# Patient Record
Sex: FEMALE | Race: WHITE
[De-identification: names, ages, dates, MRNs, and addresses within clinical notes are randomized per-mention and may not be internally consistent; named-entity substitution may affect disease eponyms.]

---

## 2018-04-12 ENCOUNTER — HOSPITAL ENCOUNTER (EMERGENCY)
Dept: HOSPITAL 31 - C.ER | Age: 58
Discharge: HOME | End: 2018-04-12
Payer: COMMERCIAL

## 2018-04-12 VITALS — HEART RATE: 67 BPM | TEMPERATURE: 97.6 F | SYSTOLIC BLOOD PRESSURE: 132 MMHG | DIASTOLIC BLOOD PRESSURE: 85 MMHG

## 2018-04-12 VITALS — RESPIRATION RATE: 20 BRPM

## 2018-04-12 DIAGNOSIS — M25.562: ICD-10-CM

## 2018-04-12 DIAGNOSIS — M25.522: Primary | ICD-10-CM

## 2018-04-12 NOTE — C.PDOC
History Of Present Illness


59 y/o female presents to ED with complaints of left elbow pain and left knee 

pain since December worsening for "several days" which prompted visit to ED 

today. Patient states he took Advil yesterday for pain with mild relief. Pain 

is worsened with movement. Patient denies swelling to hand, trauma, rash, 

weakness, numbness or any other complaints at this time. 


Time Seen by Provider: 04/12/18 08:47


Chief Complaint (Nursing): Upper Extremity Problem/Injury


History Per: Patient


History/Exam Limitations: no limitations


Onset/Duration Of Symptoms: Days


Current Symptoms Are (Timing): Still Present


Quality: "Pain"


Recent travel outside of the [place]: No





Past Medical History


Reviewed: Historical Data, Nursing Documentation, Vital Signs


Vital Signs: 


 Last Vital Signs











Temp  97.6 F   04/12/18 10:46


 


Pulse  67   04/12/18 10:46


 


Resp  20   04/12/18 10:46


 


BP  132/85   04/12/18 10:46


 


Pulse Ox  99   04/12/18 10:46














- Medical History


PMH: Fractures (Rt arm 7 years ago), HTN, Hypercholesterolemia


Surgical History: No Surg Hx


Family History: States: No Known Family Hx





- Social History


Hx Alcohol Use: Yes


Hx Substance Use: No





- Immunization History


Hx Tetanus Toxoid Vaccination: No


Hx Influenza Vaccination: No


Hx Pneumococcal Vaccination: No





Review Of Systems


Constitutional: Negative for: Fever, Weakness


Gastrointestinal: Negative for: Nausea, Vomiting


Musculoskeletal: Positive for: Arm Pain, Leg Pain


Skin: Negative for: Rash


Neurological: Negative for: Weakness, Numbness





Physical Exam





- Physical Exam


Appears: Non-toxic, No Acute Distress


Skin: Warm, Dry, No Rash


Head: Atraumatic, Normacephalic


Eye(s): bilateral: Normal Inspection


Oral Mucosa: Moist


Neck: Normal ROM, Supple


Extremity: Normal ROM, No Tenderness, Capillary Refill (<2 seconds), No Swelling

, Other (Bone spurs to left elbow and knee)


Neurological/Psych: Oriented x3, Normal Speech, Normal Motor, Normal Sensation


Gait: Steady





ED Course And Treatment


O2 Sat by Pulse Oximetry: 100 (RA)


Pulse Ox Interpretation: Normal





Medical Decision Making


Medical Decision Making: 


Plan: Tylenol and Naprosen administered





Disposition





- Disposition


Referrals: 


[facility] [Outside]


Disposition: HOME/ ROUTINE


Disposition Time: 10:29


Condition: GOOD


Additional Instructions: 


Follow up with medical doctor within 1-2 days. Return if worsened. 


Prescriptions: 


Acetaminophen [Tylenol] 325 mg PO Q6 PRN #30 tab


 PRN Reason: Pain, Mild (1-3)


Naproxen [Naprosyn] 500 mg PO BID #20 tab


Instructions:  Osteoarthritis


Forms:  [facility] (English)


Print Language: Slovak





- POA


Present On Arrival: None





- Clinical Impression


Clinical Impression: 


 Joint pain, Elbow pain, Knee pain








- PA / NP / Resident Statement


MD/DO has reviewed & agrees with the documentation as recorded.





- Scribe Statement


The provider has reviewed the documentation as recorded by the Pavanibphoebe Elmore





All medical record entries made by the Matt were at my direction and 

personally dictated by me. I have reviewed the chart and agree that the record 

accurately reflects my personal performance of the history, physical exam, 

medical decision making, and the department course for this patient. I have 

also personally directed, reviewed, and agree with the discharge instructions 

and disposition.

## 2018-04-16 VITALS — OXYGEN SATURATION: 100 %

## 2018-06-28 ENCOUNTER — HOSPITAL ENCOUNTER (EMERGENCY)
Dept: HOSPITAL 31 - C.ER | Age: 58
Discharge: HOME | End: 2018-06-28
Payer: COMMERCIAL

## 2018-06-28 VITALS
TEMPERATURE: 98.8 F | OXYGEN SATURATION: 96 % | DIASTOLIC BLOOD PRESSURE: 79 MMHG | RESPIRATION RATE: 18 BRPM | HEART RATE: 82 BPM | SYSTOLIC BLOOD PRESSURE: 115 MMHG

## 2018-06-28 DIAGNOSIS — S93.402A: Primary | ICD-10-CM
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
## 2018-06-28 DIAGNOSIS — E78.00: ICD-10-CM

## 2018-06-28 NOTE — RAD
PROCEDURE:  Left Foot Radiographs.



HISTORY:

Pain



COMPARISON:

None.



FINDINGS:



BONES:

Bone alignment is normal.  There is mild periarticular bone 

demineralization. No acute displaced fracture or bone destruction. 

There is a small plantar calcaneal spur.



JOINTS:

Normal. 



SOFT TISSUES:

There is mild dorsal soft tissue swelling. 



OTHER FINDINGS:

None.



IMPRESSION:

No acute fracture or dislocation.

## 2018-06-28 NOTE — RAD
PROCEDURE:  Left Ankle Radiographs.



HISTORY:

pain s.p fall 3 weeks ago  



COMPARISON:

None



FINDINGS:



BONES:

Normal. No fracture. 



JOINTS:

Normal. No osteoarthritis. Ankle mortise maintained. Talar dome intact



SOFT TISSUES:

Normal. 



OTHER FINDINGS:

None.



IMPRESSION:

Normal left ankle radiographs.

## 2018-06-28 NOTE — C.PDOC
History Of Present Illness


57 y/o female presents to the ER complaining of left knee and ankle pain which 

began after she twisted her left ankle and fell 3 weeks ago. Patient states 

that she applied ice and takes Tylenol or Advil. Patient wears heels daily and 

area is still swollen. Denies having weakness and numbness.


Time Seen by Provider: 06/28/18 12:15


Chief Complaint (Nursing): Lower Extremity Problem/Injury


History Per: Patient


History/Exam Limitations: no limitations


Onset/Duration Of Symptoms: Days


Current Symptoms Are (Timing): Still Present


Severity: Moderate





- Ankle/Foot


Description Of Injury: Twisted (left ankle)





Past Medical History


Reviewed: Historical Data, Nursing Documentation, Vital Signs


Vital Signs: 


 Last Vital Signs











Temp  98.8 F   06/28/18 12:03


 


Pulse  82   06/28/18 12:03


 


Resp  18   06/28/18 12:03


 


BP  115/79   06/28/18 12:03


 


Pulse Ox  96   06/28/18 13:59














- Medical History


PMH: Fractures (Rt arm 7 years ago), HTN (denies), Hypercholesterolemia


Family History: States: Unknown Family Hx





- Social History


Hx Alcohol Use: Yes


Hx Substance Use: No





- Immunization History


Hx Tetanus Toxoid Vaccination: No


Hx Influenza Vaccination: No


Hx Pneumococcal Vaccination: No





Review Of Systems


Except As Marked, All Systems Reviewed And Found Negative.


Musculoskeletal: Positive for: Foot Pain, Other (left ankle pain )





Physical Exam





- Physical Exam


Appears: Non-toxic, No Acute Distress, Other (Patient wearing 3 inch high heels)


Skin: Warm, Dry, No Rash, No Ecchymosis


Head: Atraumatic, Normacephalic


Eye(s): bilateral: Normal Inspection


Neck: Supple


Chest: Symmetrical


Respiratory: No Rales


Extremity: Normal ROM, Tenderness (tenderness to lateral aspect of left ankle, 

mild tenderness to anterior aspect of left knee), No Calf Tenderness, No 

Deformity, Swelling (moderate swelling to left ankle and foot)


Pulses: Left Dorsalis Pedis: Normal


Neurological/Psych: Oriented x3, Normal Speech


Gait: Steady





ED Course And Treatment


O2 Sat by Pulse Oximetry: 96 (RA)


Pulse Ox Interpretation: Normal





- Other Rad


  ** X-Ray- Left Knee


X-Ray: Viewed By Me, Read By Radiologist


Interpretation: PROCEDURE:  Left Knee Radiographs.  HISTORY:  Pain.  COMPARISON

:  None.  FINDINGS:  BONES:  Normal. No fracture.  JOINTS:  Normal. No 

osteoarthritis.  JOINT EFFUSION:  None.  OTHER FINDINGS:  None.  IMPRESSION:  

Normal radiographs of the left knee.





  ** X-Ray- Left Ankle


X-Ray: Viewed By Me, Read By Radiologist


Interpretation: PROCEDURE:  Left Ankle Radiographs.  HISTORY:  pain s.p fall 3 

weeks ago.  COMPARISON:  None.  FINDINGS:  BONES:  Normal. No fracture.  JOINTS

:  Normal. No osteoarthritis. Ankle mortise maintained. Talar dome intact.  

SOFT TISSUES:  Normal.  OTHER FINDINGS:  None.  IMPRESSION:  Normal left ankle 

radiographs.





  ** X-Ray- Left Foot


X-Ray: Viewed By Me, Read By Radiologist


Interpretation: PROCEDURE:  Left Foot Radiographs.  HISTORY:  Pain.  COMPARISON

:  None.  FINDINGS:  BONES:  Bone alignment is normal.  There is mild 

periarticular bone demineralization. No acute displaced fracture or bone 

destruction. There is a small plantar calcaneal spur.  JOINTS:  Normal.  SOFT 

TISSUES:  There is mild dorsal soft tissue swelling.  OTHER FINDINGS:  None.  

IMPRESSION:  No acute fracture or dislocation.





Medical Decision Making


Medical Decision Making: 


Impression: ankle and knee injury


Plan:


* Motrin PO


*  X- Ray- Left Knee


* X- Ray - Left Foot and ankle 


Progress:


xrays viewed by me showing no acute fractures or dislocations. 


Peralta dressing applied to left foot by CP and checked by me. Ortho shoe 

applied. 


Patient instructed to not wear heels. Advise rest, ice, elevation and to take 

NSAID. Instruct to follow up with Podiatry





Disposition


Counseled Patient/Family Regarding: Diagnosis, Need For Followup, Rx Given





- Disposition


Referrals: 


[facility] [Outside]


[facility] [Other]


Disposition: HOME/ ROUTINE


Disposition Time: 13:21


Condition: GOOD


Additional Instructions: 


Your xray was normal, no fracture. Please apply ice to area 15 minutes three 

times a day. Take Motrin as needed for pain every 6 hours, with food to not 

upset stomach. Follow up with podiatry clinic


Prescriptions: 


Ibuprofen [Motrin] 600 mg PO Q8 #30 tab


Instructions:  Ankle Sprain (DC)


Forms:  [facility] (English)





- POA


Present On Arrival: None





- Clinical Impression


Clinical Impression: 


 Ankle sprain








- PA / NP / Resident Statement


MD/DO has reviewed & agrees with the documentation as recorded.





- Scribe Statement


The provider has reviewed the documentation as recorded by the aMtt De Jesus





Provider Attestation





All medical record entries made by the Pavanibphoebe were at my direction and 

personally dictated by me. I have reviewed the chart and agree that the record 

accurately reflects my personal performance of the history, physical exam, 

medical decision making, and the department course for this patient. I have 

also personally directed, reviewed, and agree with the discharge instructions 

and disposition.

## 2019-02-27 ENCOUNTER — HOSPITAL ENCOUNTER (EMERGENCY)
Dept: HOSPITAL 31 - C.ER | Age: 59
Discharge: HOME | End: 2019-02-27
Payer: COMMERCIAL

## 2019-02-27 VITALS
SYSTOLIC BLOOD PRESSURE: 127 MMHG | HEART RATE: 80 BPM | RESPIRATION RATE: 20 BRPM | DIASTOLIC BLOOD PRESSURE: 80 MMHG | TEMPERATURE: 98.1 F

## 2019-02-27 VITALS — OXYGEN SATURATION: 96 %
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
## 2019-02-27 DIAGNOSIS — E78.00: ICD-10-CM

## 2019-02-27 DIAGNOSIS — S09.90XA: Primary | ICD-10-CM

## 2019-02-27 DIAGNOSIS — I10: ICD-10-CM
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
## 2019-02-27 DIAGNOSIS — T14.8XXA: ICD-10-CM

## 2019-02-27 NOTE — RAD
Date of service: 



02/27/2019



PROCEDURE:  Left Knee Radiographs.



HISTORY:

Pain.



COMPARISON:

Comparison made with prior radiographs left knee 06/28/2018.



FINDINGS:



BONES:

No evidence of acute displaced fracture nor dislocation.  Osseous 

structures intact. 



JOINTS:

Joint spaces preserved.  No significant osteoarthritis. 



JOINT EFFUSION:

Suspect trace joint effusion. 



OTHER FINDINGS:

None.



IMPRESSION:

No acute fractures..

## 2019-02-27 NOTE — CT
Date of service: 



02/27/2019



PROCEDURE:  CT Cervical Spine without contrast



HISTORY:

Neck pain. 



COMPARISON:

None available.



TECHNIQUE:

Axial computed tomography images were obtained of the cervical spine 

without the use of intravenous contrast. Coronal and sagittal 

reformatted images were created and reviewed.



Radiation dose:



Total exam DLP = 479.63 mGy-cm.



This CT exam was performed using one or more of the following dose 

reduction techniques: Automated exposure control, adjustment of the 

mA and/or kV according to patient size, and/or use of iterative 

reconstruction technique.



FINDINGS:



VERTEBRAE:

.  The the no acute compression fractures nor retropulsed fragments.  

There is very slight anterior stature loss of the C5 segment felt to 

be degenerative in origin.  Vertebral bodies otherwise exhibit normal 

stature..  There is mild straightening of the normal cervical 

lordosis which may in part be due to patient positioning gantry 

however underlying element of muscle spasm may contribute 



DISCS/SPINAL CANAL/NEURAL FORAMINA:

Multilevel degenerative spondylosis. 



At the C2-C3 level, there is mild disc space narrowing.  There is 

what appears represent a small central and bilateral (left slightly 

larger than right) disc protrusion which compresses the ventral 

surface of the thecal sac though does not cause significant cord 

compression or canal stenosis.  The exit foramina adequate. 



At the C3-C4 level, there is disc space narrowing.  Small central and 

bilateral focal disc bulge indents the ventral surface of the thecal 

sac and appears to reach the ventral surface of the cord.  Central 

canal appears adequate.  Exit foramina are also adequate despite 

minimally overgrown  facet joints. 



At the C 4 C5 level, there is disc space narrowing with a small focal 

central disc herniation that compresses the ventral surface of the 

thecal sac and spinal cord.  Central canal does appears marginal to 

minimally narrowed.  Minimal degenerative squaring of the 

uncovertebral joints.  The left facet joints hypertrophic.  Exit 

foramina are marginal to adequate on the right and marginal to 

slightly narrowed on the left. 



At the C5-C6 level, there is marked disc space narrowing with 

endplate eburnation and subchondral cystic changes..  Small of 

broad-based osteophytic ridge disc complex asymmetrically larger on 

the left than right and contiguous with hypertrophic  uncovertebral  

joints noted..  The changes result in minor flattening of the ventral 

surface of the thecal sac and minor flattening of the ventral surface 

of the cord more so on the left side.  Central canal slightly 

narrowed.  The facets are also hypertrophic.  Exit foramina are 

stenotic bilaterally.



Less severe but similar changes seen at the C6-C7 level with 

asymmetric disc ridge complex slightly larger on the left than right 

and also contiguous with hypertrophic uncovertebral joints.  The 

central canal appears adequate.  Left exit foramen is stenotic.  

Right exit foramen is marginal to adequate.  



PARASPINAL SOFT TISSUES:

Unremarkable. 



OTHER FINDINGS:

Lung apices are clear. 



IMPRESSION:

No acute fractures.  Multilevel degenerative spondylosis as described.

## 2019-02-27 NOTE — RAD
Date of service: 



02/27/2019



PROCEDURE:  Radiographs of the Lumbar Spine.



HISTORY:

Trauma 



COMPARISON:

No prior.



FINDINGS:



BONES:

There are no acute compression fractures no retropulsed fragments.  

Vertebral bodies exhibit relatively normal stature.  There is a mild 

dextroscoliosis centered at approximately the L2-L3 level.  Vertebral 

bodies otherwise exhibit normal alignment.  Facets normally aligned.  

. 



DISC SPACES:

Multilevel degenerative spondylosis most notably affecting the L4-L5 

and L5-S1 levels..  Changes include varying degrees of disc space 

narrowing more so along the posterior disc margins with endplate 

eburnation and small anterolateral as well as posterior osteophyte 

formation.  Facets also mildly hypertrophic the L5-S1 through the L 2 

L3 levels in decreasing order of severity



OTHER FINDINGS:

None.



IMPRESSION:

No acute fractures.  Mild multilevel degenerative spondylosis.  Mild 

dextroscoliosis.

## 2019-02-27 NOTE — C.PDOC
History Of Present Illness


58 year old female presents to ED s/p fall that occurred outside. Patient fell 

outside injuring her left knee. Patient also has a small abrasion to her nose 

and laceration to her lower lip. She has a history of dizziness. Patient claims 

to have lost consciousness when she fell. She denies any numbness or weakness. 








- HPI


Time Seen by Provider: 19 09:54


Chief Complaint (Nursing): Trauma


History Per: Patient


History/Exam Limitations: no limitations


Onset/Duration Of Symptoms: Hrs


Location Of Injury: Left: Knee (abrasion), Anterior: Knee


Associated Symptoms: LOC





- Fall


Fall:Prior To Injury: Tripped





Past Medical History


Reviewed: Historical Data, Nursing Documentation, Vital Signs


Vital Signs: 





                                Last Vital Signs











Temp  98.3 F   19 09:52


 


Pulse  96 H  19 09:52


 


Resp  18   19 09:52


 


BP  143/92 H  19 09:52


 


Pulse Ox  96   19 09:52














- Medical History


PMH: Fractures (Rt arm 7 years ago), HTN, Hypercholesterolemia


   Denies: Chronic Kidney Disease


Surgical History: No Surg Hx


Family History: States: Unknown Family Hx





- Social History


Hx Alcohol Use: Yes


Hx Substance Use: No





- Immunization History


Hx Tetanus Toxoid Vaccination: No


Hx Influenza Vaccination: No


Hx Pneumococcal Vaccination: No





Review Of Systems


Constitutional: Negative for: Fever, Chills, Weakness


Musculoskeletal: Positive for: Leg Pain (abrasion to the left knee)


Neurological: Negative for: Weakness, Numbness, Headache, Dizziness





Physical Exam





- Physical Exam


Appears: Well, Non-toxic, No Acute Distress


Skin: Normal Color, Warm, Dry


Head: Atraumatic, Normacephalic


Nose: Other (Superficial abrasion )


Lips: Laceration (inner aspect of the lower lip)


Teeth: Normal Dentition (intact), No Loose


Neck: Normal ROM, Supple


Chest: Symmetrical, No Deformity


Respiratory: No Accessory Muscle Use


Gastrointestinal/Abdominal: Soft, No Tenderness


Extremity: Capillary Refill (<2 seconds), Other (Left knee abrasion)


Extremity: Right: Atraumatic, Normal Color And Temperature, Normal ROM


Neurological/Psych: Oriented x3, Normal Speech, Normal Cognition


Gait: Steady





ED Course And Treatment


ECG: Interpreted By Me, Viewed By Me


ECG Rhythm: Sinus Rhythm


ECG Interpretation: Normal


Rate From EC


O2 Sat by Pulse Oximetry: 96 (RA)


Pulse Ox Interpretation: Normal





- Other Rad


  ** Left Knee X-ray


X-Ray: Interpreted by Me, Viewed By Me


Interpretation: Accession No. : [id number].  Patient Name / ID : ANTONY SANDS  / [id number].  Exam Date : 2019 10:17:07 ( Approved ).  Study 

Comment :  Sex / Age : F  / 58Y.  Creator : femi martin.  Dictator : Dominick Massey MD.   :  Approver : Dominick Massey MD.  Approver2 

:  Report Date : 2019 10:32:43.  My Comment :  

********************************************************************************


***.  This report is currently processing and HAS NOT BEEN OFFICIALLY SIGNED BY 

THE PHYSICIAN - ESTIMATED TIME OF APPROVAL IS 2019 11:07.  Date of 

service:  2019.  PROCEDURE:  Left Knee Radiographs.  HISTORY:  Pain.  

COMPARISON:  Comparison made with prior radiographs left knee 2018.  

FINDINGS:  BONES:  No evidence of acute displaced fracture nor dislocation.  

Osseous structures intact.  JOINTS:  Joint spaces preserved.  No significant os

teoarthritis.  JOINT EFFUSION:  Suspect trace joint effusion.  OTHER FINDINGS:  

None.  IMPRESSION:  No acute fractures..





  ** L-spine X-ray


X-Ray: Interpreted by Me, Viewed By Me


Interpretation: Accession No. : [id number].  Patient Name / ID : ANTONY SANDS  / [id number].  Exam Date : 2019 10:16:53 ( Approved ).  Study 

Comment :  Sex / Age : F  / 58Y.  Creator : femi martin.  Dictator : Dominick Massey MD.   :  Approver : Dominick Massey MD.  Approver2 

:  Report Date : 2019 10:34:10.  My Comment :  ****

*******************************************************************************.

 Date of service:  2019.  PROCEDURE:  Radiographs of the Lumbar Spine.  

HISTORY:  Trauma.  COMPARISON:  No prior.  FINDINGS:  BONES:  There are no acute

compression fractures no retropulsed fragments.  Vertebral bodies exhibit 

relatively normal stature.  There is a mild dextroscoliosis centered at 

approximately the L2-L3 level.  Vertebral bodies otherwise exhibit normal 

alignment.  Facets normally aligned.  .  DISC SPACES:  Multilevel degenerative 

spondylosis most notably affecting the L4-L5 and L5-S1 levels..  Changes include

varying degrees of disc space narrowing more so along the posterior disc margins

with endplate eburnation and small anterolateral as well as posterior osteophyte

formation.  Facets also mildly hypertrophic the L5-S1 through the L 2 L3 levels 

in decreasing order of severity.  OTHER FINDINGS:  None.  IMPRESSION:  No acute 

fractures.  Mild multilevel degenerative spondylosis.  Mild dextroscoliosis.





- CT Scan/US


  ** Head CT


Other Rad Studies (CT/US): Interpreted By Me, Read By Radiologist


CT/US Interpretation: Accession No. : [id number].  Patient Name / ID : 

ANTONY SANDS  / [id number].  Exam Date : 2019 10:38:19 ( Approved ).  

Study Comment :  Sex / Age : F  / 58Y.  Creator : Kala Barroso.  Dictator : 

Jackie Fraire MD.   :  Approver : Jackie Fraire MD.  

Approver2 :  Report Date : 2019 10:45:02.  My Comment :  

*******************************************

****************************************.  Date of service: 2019.  

PROCEDURE:  CT HEAD WITHOUT CONTRAST.  HISTORY:  R/O Bleed.  COMPARISON:  

Noncontrast head CT performed 16.  TECHNIQUE:  Axial computed tomography 

images were obtained through the head/brain without intravenous contrast.  

Radiation dose:  Total exam DLP = 1068.97 mGy-cm.  This CT exam was performed 

using one or more of the following dose reduction techniques: Automated exposure

control, adjustment of the mA and/or kV according to patient size, and/or use of

iterative reconstruction technique.  FINDINGS:  Mild streak artifact limits 

evaluation of the skull base.  HEMORRHAGE:  No intracranial hemorrhage.  BRAIN: 

No mass effect or edema.  Intracranial atherosclerotic calcifications.  Gray-

white matter differentiation appears intact. Please note that MRI with diffusion

imaging is more sensitive in the detection of acute ischemic event.  VENTRICLES:

 No hydrocephalus.  CALVARIUM:  Unremarkable.  PARANASAL SINUSES:  Unremarkable 

as visualized. No significant inflammatory changes.  MASTOID AIR CELLS:  

Unremarkable as visualized. No inflammatory changes.  OTHER FINDINGS:  None.  

IMPRESSION:  No acute intracranial pathology identified.





  ** C-spine CT


Other Rad Studies (CT/US): Interpreted By Me, Read By Radiologist


CT/US Interpretation: Accession No. : [id number].  Patient Name / ID : 

ANTONY SANDS  / [id number].  Exam Date : 2019 10:40:56 ( Approved ).  

Study Comment :  Sex / Age : F  / 58Y.  Creator : Dominick Massey MD.  

Dictator : Dominick Massey MD.   :  Approver : Dominick Massey MD.  Approver2 :  Report Date : 2019 12:12:16.  My Comment :  

********************************************************************

***************.  Date of service:  2019.  PROCEDURE:  CT Cervical Spine 

without contrast.  HISTORY:  Neck pain.  COMPARISON:  None available.  

TECHNIQUE:  Axial computed tomography images were obtained of the cervical spine

without the use of intravenous contrast. Coronal and sagittal reformatted images

were created and reviewed.  Radiation dose:  Total exam DLP = 479.63 mGy-cm.  

This CT exam was performed using one or more of the following dose reduction 

techniques: Automated exposure control, adjustment of the mA and/or kV according

to patient size, and/or use of iterative reconstruction technique.  FINDINGS:  

VERTEBRAE:  .  The the no acute compression fractures nor retropulsed fragments.

 There is very slight anterior stature loss of the C5 segment felt to be 

degenerative in origin.  Vertebral bodies otherwise exhibit normal stature..  

There is mild straightening of the normal cervical lordosis which may in part be

due to patient positioning gantry however underlying element of muscle spasm may

contribute.  DISCS/SPINAL CANAL/NEURAL FORAMINA:  Multilevel degenerative 

spondylosis.  At the C2-C3 level, there is mild disc space narrowing.  There is 

what appears represent a small central and bilateral (left slightly larger than 

right) disc protrusion which compresses the ventral surface of the thecal sac 

though does not cause significant cord compression or canal stenosis.  The exit 

foramina adequate.  At the C3-C4 level, there is disc space narrowing.  Small 

central and bilateral focal disc bulge indents the ventral surface of the thecal

sac and appears to reach the ventral surface of the cord.  Central canal appears

adequate.  Exit foramina are also adequate despite minimally overgrown  facet 

joints.  At the C 4 C5 level, there is disc space narrowing with a small focal 

central disc herniation that compresses the ventral surface of the thecal sac 

and spinal cord.  Central canal does appears marginal to minimally narrowed.  

Minimal degenerative squaring of the uncovertebral joints.  The left facet 

joints hypertrophic.  Exit foramina are marginal to adequate on the right and 

marginal to slightly narrowed on the left.  At the C5-C6 level, there is marked 

disc space narrowing with endplate eburnation and subchondral cystic changes..  

Small of broad-based osteophytic ridge disc complex asymmetrically larger on the

left than right and contiguous with hypertrophic  uncovertebral  joints noted.. 

The changes result in minor flattening of the ventral surface of the thecal sac 

and minor flattening of the ventral surface of the cord more so on the left shannon

e.  Central canal slightly narrowed.  The facets are also hypertrophic.  Exit 

foramina are stenotic bilaterally.  Less severe but similar changes seen at the 

C6-C7 level with asymmetric disc ridge complex slightly larger on the left than 

right and also contiguous with hypertrophic uncovertebral joints.  The central 

canal appears adequate.  Left exit foramen is stenotic.  Right exit foramen is 

marginal to adequate.  PARASPINAL SOFT TISSUES:  Unremarkable.  OTHER FINDINGS: 

Lung apices are clear.  IMPRESSION:  No acute fractures.  Multilevel 

degenerative spondylosis as described.


Progress Note: Treated with tylenol PO


Reassessment Condition: Improved





Medical Decision Making


Medical Decision Making: 


Impression: 58 year old with abrasion to the left knee





Plan:


Patient given Tylenol PO. 


C-spine CT, Head CT, Left Knee X-ray, and LS X-ray ordered for patient. 


Glucose POC.  








Gkucose POC: 120


C-spine CT -normal


Head CT- normal


Left knee X-ray and LS X-ray - normal 





Upon reassessment, patient is resting comfortably, in no distress, and is stable

for discharge. Patient is advised to follow up with PMD for further evaluations.

Patient is advised to return to ED if symptoms persist or worsen. 








Disposition


Counseled Patient/Family Regarding: Studies Performed, Diagnosis, Need For 

Followup





- Disposition


Referrals: 


Neville Singh MD [Medical Doctor] - 


Disposition: HOME/ ROUTINE


Disposition Time: 12:00


Condition: STABLE


Additional Instructions: 


Follow up with PMD for further evaluation, Return to ED if any concerns


Instructions:  Skin Abrasions, Minor Head Injury


Forms:  [employer] (English)





- POA


Present On Arrival: None





- Clinical Impression


Clinical Impression: 


 Head injury, Multiple contusions








- PA / NP / Resident Statement


MD/DO has reviewed & agrees with the documentation as recorded. (Estefany Rodriguez)





- Scribe Statement


The provider has reviewed the documentation as recorded by the Scribe (Estefany Rodriguez)








All medical record entries made by the Scribe were at my direction and 

personally dictated by me. I have reviewed the chart and agree that the record 

accurately reflects my personal performance of the history, physical exam, 

medical decision making, and the department course for this patient. I have also

personally directed, reviewed, and agree with the discharge instructions and 

disposition.

## 2019-02-27 NOTE — CT
Date of service: 02/27/2019



PROCEDURE:  CT HEAD WITHOUT CONTRAST.



HISTORY:

R/O Bleed



COMPARISON:

Noncontrast head CT performed 4/17/16



TECHNIQUE:

Axial computed tomography images were obtained through the head/brain 

without intravenous contrast.  



Radiation dose:



Total exam DLP = 1068.97 mGy-cm.



This CT exam was performed using one or more of the following dose 

reduction techniques: Automated exposure control, adjustment of the 

mA and/or kV according to patient size, and/or use of iterative 

reconstruction technique.



FINDINGS:

Mild streak artifact limits evaluation of the skull base. 



HEMORRHAGE:

No intracranial hemorrhage. 



BRAIN:

No mass effect or edema.  Intracranial atherosclerotic 

calcifications.  Gray-white matter differentiation appears intact. 

Please note that MRI with diffusion imaging is more sensitive in the 

detection of acute ischemic event.



VENTRICLES:

No hydrocephalus. 



CALVARIUM:

Unremarkable.



PARANASAL SINUSES:

Unremarkable as visualized. No significant inflammatory changes.



MASTOID AIR CELLS:

Unremarkable as visualized. No inflammatory changes.



OTHER FINDINGS:

None.



IMPRESSION:

No acute intracranial pathology identified.

## 2019-04-07 ENCOUNTER — HOSPITAL ENCOUNTER (EMERGENCY)
Dept: HOSPITAL 14 - H.ER | Age: 59
LOS: 1 days | Discharge: HOME | End: 2019-04-08
Payer: COMMERCIAL

## 2019-04-07 VITALS — BODY MASS INDEX: 30.1 KG/M2

## 2019-04-07 DIAGNOSIS — I10: ICD-10-CM

## 2019-04-07 DIAGNOSIS — E78.00: ICD-10-CM

## 2019-04-07 DIAGNOSIS — G89.29: ICD-10-CM

## 2019-04-07 DIAGNOSIS — M54.9: Primary | ICD-10-CM

## 2019-04-07 DIAGNOSIS — Z88.6: ICD-10-CM

## 2019-04-07 NOTE — ED PDOC
HPI: Psych/Substance Abuse


Time Seen by Provider: 04/07/19 22:57


Chief Complaint (Nursing): Medical Clearance


Chief Complaint (Provider): Medical Clearance


History Per: Patient


Additional Complaint(s): 





Lora Mortensen is a 59 year old  female with a past medical history of 

HTN, HLD, chronic back pain and 5 herniated discs, who presents to the emergency

department accompanied by [facility] for medical and psychiatric clearance 

and is currently complaining of having a headache. Patient has not taken her 

medication since yesterday. She is uncertain of which medications she is on, but

states she is on five. Patient also cites she has an aspirin allergy. She denies

any suicidal or homicidal ideation, nausea, vomiting, neck pain or fever. 





PMD: Bola Gavin





Past Medical History


Reviewed: Historical Data, Nursing Documentation, Vital Signs


Vital Signs: 





                                Last Vital Signs











Temp  97.8 F   04/07/19 22:53


 


Pulse  91 H  04/07/19 22:53


 


Resp  18   04/07/19 22:53


 


BP  153/109 H  04/07/19 22:53


 


Pulse Ox  100   04/07/19 22:53














- Medical History


PMH: Back Problems, Fractures (Rt arm 7 years ago), HTN, Hypercholesterolemia


   Denies: Chronic Kidney Disease





- Surgical History


Surgical History: Hernia Repair (5)





- Family History


Family History: States: Unknown Family Hx





- Immunization History


Hx Tetanus Toxoid Vaccination: No


Hx Influenza Vaccination: No


Hx Pneumococcal Vaccination: No





- Home Medications


Home Medications: 


                                Ambulatory Orders











 Medication  Instructions  Recorded


 


Ibuprofen [Motrin] 600 mg PO Q8 #30 tab 06/28/18














- Allergies


Allergies/Adverse Reactions: 


                                    Allergies











Allergy/AdvReac Type Severity Reaction Status Date / Time


 


aspirin Allergy Severe SWELLING Verified 04/07/19 22:53














Review of Systems


ROS Statement: Except As Marked, All Systems Reviewed And Found Negative


Constitutional: Negative for: Fever


Gastrointestinal: Negative for: Nausea, Vomiting


Musculoskeletal: Positive for: Back Pain.  Negative for: Neck Pain


Neurological: Positive for: Headache


Psych: Negative for: Suicidal ideation (HI)





Physical Exam





- Reviewed


Nursing Documentation Reviewed: Yes


Vital Signs Reviewed: Yes





- Physical Exam


Appears: Positive for: Non-toxic, No Acute Distress


Head Exam: Positive for: ATRAUMATIC, NORMOCEPHALIC


Skin: Positive for: Normal Color, Warm, Dry


Eye Exam: Positive for: Normal appearance, EOMI, PERRL


ENT: Positive for: Normal ENT Inspection


Neck: Positive for: Normal, Painless ROM, Supple


Cardiovascular/Chest: Positive for: Regular Rate, Rhythm.  Negative for: Murmur


Respiratory: Positive for: Normal Breath Sounds.  Negative for: Respiratory 

Distress


Gastrointestinal/Abdominal: Positive for: Normal Exam, Soft.  Negative for: 

Tenderness


Back: Positive for: Normal Inspection.  Negative for: L CVA Tenderness, R CVA 

Tenderness, Vertebral Tenderness


Extremity: Positive for: Normal ROM.  Negative for: Pedal Edema, Deformity


Neurological/Psych: Positive for: Awake, Alert, Normal Tone





- ECG


O2 Sat by Pulse Oximetry: 100 (RA)


Pulse Ox Interpretation: Normal





Medical Decision Making


Medical Decision Making: 





Time: 2317


Impression: 59 year old  female, presenting with chronic back pain. Will

 give a trial of Lidoderm patch, Tylenol and Flexeril. 


Plan: 


--Crisis Evaluation


--Tylenol 975 mg PO


--Flexeril 10 mg PO


--Lidoderm Patch 1 ea TD 





Time: 00:25


--Patient has been cleared by Crisis worker. 


 

--------------------------------------------------------------------------------


-----------------


Scribe Attestation:


Documented by Ventura Melgar, acting as a scribe Arleen Peña MD.





Provider Scribe Attestation:


All medical record entries made by the Scribe were at my direction and 

personally dictated by me. I have reviewed the chart and agree that the record 

accurately reflects my personal performance of the history, physical exam, 

medical decision making, and the department course for this patient. I have also

 personally directed, reviewed, and agree with the discharge instructions and 

disposition. 





Disposition





- Clinical Impression


Clinical Impression: 


 Chronic back pain








- Disposition


Disposition: Discharged/Transfer to Law Enforcement


Disposition Time: 00:25


Condition: STABLE


Additional Instructions: 


Patient is medically and psychiatrically stable for incarceration 


Instructions:  Low Back Pain in Adults, Chronic Pain (DC), General (DC)


Forms:  [facility] (English)


Print Language: Greenlandic

## 2019-04-08 VITALS — OXYGEN SATURATION: 98 % | RESPIRATION RATE: 16 BRPM

## 2019-04-08 VITALS — TEMPERATURE: 97.6 F | HEART RATE: 76 BPM | DIASTOLIC BLOOD PRESSURE: 89 MMHG | SYSTOLIC BLOOD PRESSURE: 140 MMHG
